# Patient Record
Sex: FEMALE | Race: WHITE | NOT HISPANIC OR LATINO | URBAN - NONMETROPOLITAN AREA
[De-identification: names, ages, dates, MRNs, and addresses within clinical notes are randomized per-mention and may not be internally consistent; named-entity substitution may affect disease eponyms.]

---

## 2021-03-02 ENCOUNTER — OFFICE (OUTPATIENT)
Dept: URBAN - NONMETROPOLITAN AREA CLINIC 15 | Facility: CLINIC | Age: 86
End: 2021-03-02
Payer: COMMERCIAL

## 2021-03-02 VITALS
WEIGHT: 143 LBS | SYSTOLIC BLOOD PRESSURE: 186 MMHG | DIASTOLIC BLOOD PRESSURE: 92 MMHG | TEMPERATURE: 97.7 F | HEIGHT: 66 IN | RESPIRATION RATE: 16 BRPM | HEART RATE: 45 BPM

## 2021-03-02 DIAGNOSIS — R93.89 ABNORMAL FINDINGS ON DIAGNOSTIC IMAGING OF OTHER SPECIFIED B: ICD-10-CM

## 2021-03-02 DIAGNOSIS — R63.4 ABNORMAL WEIGHT LOSS: ICD-10-CM

## 2021-03-02 DIAGNOSIS — Z86.010 PERSONAL HISTORY OF COLONIC POLYPS: ICD-10-CM

## 2021-03-02 DIAGNOSIS — R19.7 DIARRHEA, UNSPECIFIED: ICD-10-CM

## 2021-03-02 PROCEDURE — 99204 OFFICE O/P NEW MOD 45 MIN: CPT | Performed by: INTERNAL MEDICINE

## 2021-03-02 NOTE — SERVICENOTES
I had a lengthy discussion with the patient and her son who is present today.  We discussed the fact that she is currently having no symptoms that I would attribute to potential choledocholithiasis.  We also discussed the risk involved with ERCP, particularly at her age.  I talk with her about the warning signs that this could be causing trouble such as jaundice, dark urine, abdominal pain or unexplained fever or chills.  If any of the symptoms did develop, we could recheck liver enzymes and proceed with ERCP at that time.  She and her son who is present agree with the plan.  We will hold off on scheduling for ERCP at the present time.  I will obtain a copy of her more recent lab work that hopefully will include liver enzymes.

## 2021-03-02 NOTE — SERVICEHPINOTES
The patient is an 89 year old female here today for an extended visit. She has a PMH of DM type 2, Afib, HTN and breast cancer s/p right mastectomy. She recalls having her cholecystectomy in 2004 by Dr. Camacho followed by an ERCP by me to remove a bile duct stone. I do not have a copy of that report in my records but she is quite specific about this and is a good historian.  She is referred today by Dr. Hernandez for an abnormal CT abdomen2/22/21 that suggests choledocholithiasis. She has lost 10 pounds unintentionally in one year during the pandemic but has been very isolated at home with few visitors. She denies fever, chills and abdominal pain. She reports the urge to have a BM after eating meals and some loose stools as well. She takes Metformin daily. She denies blood in stool. She states she had a normal colonoscopy in 2010 by Dr. Camacho. She has had a colon polyp in the past. She has not had a history of pancreatitis. She does report occasional LLQ discomfort. She has received both of her Covid-19 vaccinations. She had normal liver enzymes on 10/27/2020 and had lab done in February which I do not have here to view today.